# Patient Record
Sex: FEMALE | Race: ASIAN | NOT HISPANIC OR LATINO | URBAN - METROPOLITAN AREA
[De-identification: names, ages, dates, MRNs, and addresses within clinical notes are randomized per-mention and may not be internally consistent; named-entity substitution may affect disease eponyms.]

---

## 2020-03-06 ENCOUNTER — EMERGENCY (EMERGENCY)
Facility: HOSPITAL | Age: 45
LOS: 1 days | Discharge: HOME | End: 2020-03-06
Attending: EMERGENCY MEDICINE | Admitting: EMERGENCY MEDICINE
Payer: MEDICAID

## 2020-03-06 VITALS — DIASTOLIC BLOOD PRESSURE: 73 MMHG | TEMPERATURE: 98 F | SYSTOLIC BLOOD PRESSURE: 136 MMHG | HEART RATE: 65 BPM

## 2020-03-06 VITALS
SYSTOLIC BLOOD PRESSURE: 138 MMHG | TEMPERATURE: 98 F | HEART RATE: 60 BPM | DIASTOLIC BLOOD PRESSURE: 87 MMHG | OXYGEN SATURATION: 99 %

## 2020-03-06 PROCEDURE — 99283 EMERGENCY DEPT VISIT LOW MDM: CPT

## 2020-03-06 RX ORDER — MECLIZINE HCL 12.5 MG
25 TABLET ORAL ONCE
Refills: 0 | Status: COMPLETED | OUTPATIENT
Start: 2020-03-06 | End: 2020-03-06

## 2020-03-06 RX ORDER — ACETAMINOPHEN 500 MG
650 TABLET ORAL ONCE
Refills: 0 | Status: COMPLETED | OUTPATIENT
Start: 2020-03-06 | End: 2020-03-06

## 2020-03-06 RX ORDER — MECLIZINE HCL 12.5 MG
1 TABLET ORAL
Qty: 20 | Refills: 0
Start: 2020-03-06

## 2020-03-06 RX ADMIN — Medication 650 MILLIGRAM(S): at 10:59

## 2020-03-06 NOTE — ED ADULT TRIAGE NOTE - CHIEF COMPLAINT QUOTE
Headache and dizziness x 3-4 years.  Pt endorses negative workup at [facility].  MRI negative.  Currently episode of dizziness and head x few weeks.  Endorses sensitivity to noises.

## 2020-03-06 NOTE — ED PROVIDER NOTE - PATIENT PORTAL LINK FT
You can access the FollowMyHealth Patient Portal offered by [facility] by registering at the following website: [email or web address]. By joining [facility]’s FollowMyHealth portal, you will also be able to view your health information using other applications (apps) compatible with our system.

## 2020-03-06 NOTE — ED PROVIDER NOTE - PLAN OF CARE
You were seen today for your dizziness.  This is likely due to vertigo.  Take the prescribed medication as needed for dizziness.  Stay well hydrated.  Be sure to follow up with your primary care physician in 2-3 days for re-evaluation.  Make an appointment to be seen by neurology at the next available appointment.  RETURN TO THE EMERGENCY DEPARTMENT IMMEDIATELY IF YOU HAVE SEVERE HEADACHE, CONFUSION, NUMBNESS/TINGLING/WEAKNESS TO YOUR ARMS OR LEGS, OR FOR ANY OTHER CONCERN.

## 2020-03-06 NOTE — ED PROVIDER NOTE - OBJECTIVE STATEMENT
Pt is a 43 y/o F with hx of chronic headaches and dizziness for the past 6 years. She has been seen at multiple EDs this winter. She follows up with neuro. Pt states she's had negative CT scan, MRI, and blood tests. Continued symptoms.

## 2020-03-06 NOTE — ED ADULT NURSE NOTE - OBJECTIVE STATEMENT
Pt presents to ED with headache and dizziness. Pt states she has been having dizziness for the past 7 years. Pt AxOx3, ambulatory. pt has been seen at 3 different hospitals, has had numerous CT scans as well as MRI. Pt states she also has a neurologist. Pt denies chest pain, lightheadedness. pt denies shortness of breath, breathing even and unlabored. No nausea/vomiting noted. Skin clean dry and intact. pt refusing meclizine at this time because it does not work for her. MD Holden made aware. Pt asking for tylenol for headache, MD aware, awaiting orders at this time. Pt presents to ED with headache and dizziness. Pt states she has been having dizziness for the past 7 years. Pt AxOx3, ambulatory. pt has been seen at 3 different hospitals, has had numerous CT scans as well as MRI. Pt states she also has a neurologist. Pt states that all the tests came back negative and she was told that everything is normal. Pt denies chest pain, lightheadedness. pt denies shortness of breath, breathing even and unlabored. No nausea/vomiting noted. Skin clean dry and intact. pt refusing meclizine at this time because it does not work for her, pt states it makes her sleepy. MD Holden made aware. Pt asking for tylenol for headache, MD aware, awaiting orders at this time. Pt presents to ED with headache and dizziness. Pt states she has been having dizziness for the past 7 years. Pt AxOx3, ambulatory. pt has been seen at 3 different hospitals, has had numerous CT scans as well as MRI. Pt states she also has a neurologist. Pt states that all the tests came back negative and she was told that everything is normal. Pt denies chest pain, lightheadedness. pt denies shortness of breath, breathing even and unlabored. No nausea/vomiting noted. Skin clean dry and intact. No numbness/tingling noted to bilateral lower extremities. Pt states her feet are always cold. pt refusing meclizine at this time because it does not work for her, pt states it makes her sleepy. MD Holden made aware. Pt asking for tylenol for headache, MD aware, awaiting orders at this time.

## 2020-03-06 NOTE — ED ADULT NURSE NOTE - CHIEF COMPLAINT QUOTE
Headache and dizziness x 3-4 years.  Pt endorses negative workup at [facility].  MRI negative.  Currently episode of dizziness and head x few weeks.  Endorses sensitivity to noises.

## 2020-03-06 NOTE — ED PROVIDER NOTE - CARE PLAN
Principal Discharge DX:	Chronic headache  Assessment and plan of treatment:	You were seen today for your dizziness.  This is likely due to vertigo.  Take the prescribed medication as needed for dizziness.  Stay well hydrated.  Be sure to follow up with your primary care physician in 2-3 days for re-evaluation.  Make an appointment to be seen by neurology at the next available appointment.  RETURN TO THE EMERGENCY DEPARTMENT IMMEDIATELY IF YOU HAVE SEVERE HEADACHE, CONFUSION, NUMBNESS/TINGLING/WEAKNESS TO YOUR ARMS OR LEGS, OR FOR ANY OTHER CONCERN.  Secondary Diagnosis:	Dizziness
